# Patient Record
Sex: FEMALE | Race: WHITE | Employment: FULL TIME | URBAN - METROPOLITAN AREA
[De-identification: names, ages, dates, MRNs, and addresses within clinical notes are randomized per-mention and may not be internally consistent; named-entity substitution may affect disease eponyms.]

---

## 2018-10-15 ENCOUNTER — APPOINTMENT (OUTPATIENT)
Dept: LAB | Age: 53
End: 2018-10-15
Attending: INTERNAL MEDICINE
Payer: COMMERCIAL

## 2018-10-15 ENCOUNTER — OFFICE VISIT (OUTPATIENT)
Dept: INTERNAL MEDICINE CLINIC | Facility: CLINIC | Age: 53
End: 2018-10-15
Payer: COMMERCIAL

## 2018-10-15 ENCOUNTER — NURSE TRIAGE (OUTPATIENT)
Dept: OTHER | Age: 53
End: 2018-10-15

## 2018-10-15 VITALS
SYSTOLIC BLOOD PRESSURE: 120 MMHG | WEIGHT: 114 LBS | BODY MASS INDEX: 20.98 KG/M2 | DIASTOLIC BLOOD PRESSURE: 80 MMHG | TEMPERATURE: 97 F | HEIGHT: 62 IN | RESPIRATION RATE: 12 BRPM | HEART RATE: 81 BPM

## 2018-10-15 DIAGNOSIS — R31.9 HEMATURIA, UNSPECIFIED TYPE: Primary | ICD-10-CM

## 2018-10-15 DIAGNOSIS — R31.9 HEMATURIA, UNSPECIFIED TYPE: ICD-10-CM

## 2018-10-15 PROCEDURE — 81001 URINALYSIS AUTO W/SCOPE: CPT

## 2018-10-15 PROCEDURE — 99212 OFFICE O/P EST SF 10 MIN: CPT | Performed by: INTERNAL MEDICINE

## 2018-10-15 PROCEDURE — 99214 OFFICE O/P EST MOD 30 MIN: CPT | Performed by: INTERNAL MEDICINE

## 2018-10-15 PROCEDURE — 87086 URINE CULTURE/COLONY COUNT: CPT

## 2018-10-15 NOTE — PROGRESS NOTES
HPI:    Patient ID: Jesus Ortiz is a 46year old female. Hematuria   This is a new problem. The current episode started in the past 7 days (2 to 3 days). The problem has been waxing and waning since onset.  She describes the hematuria as gross hemat She exhibits no distension and no mass. There is no tenderness. There is no rebound, no guarding and no CVA tenderness. Musculoskeletal: She exhibits no edema. Lymphadenopathy:     She has no cervical adenopathy. Neurological: She is alert.    Skin: N

## 2018-10-15 NOTE — TELEPHONE ENCOUNTER
Action Requested: Summary for Provider     []  Critical Lab, Recommendations Needed  [] Need Additional Advice  []   FYI    []   Need Orders  [] Need Medications Sent to Pharmacy  []  Other     SUMMARY: DR Kincaid: any way you can add patient to schedul

## 2018-10-16 ENCOUNTER — TELEPHONE (OUTPATIENT)
Dept: OTHER | Age: 53
End: 2018-10-16

## 2018-10-16 DIAGNOSIS — R31.9 PAINLESS HEMATURIA: Primary | ICD-10-CM

## 2018-10-16 NOTE — TELEPHONE ENCOUNTER
Her urinalysis showed lots of red blood cells and no white cells. Her urine culture negative so she doesn't have uti but needs further eval for cause of hematuria.   She will need to get ct urogram (order in epic but may need PA so have managed care work ch

## 2018-10-17 NOTE — TELEPHONE ENCOUNTER
Spoke with patient (identified name and ), results reviewed and agrees with plan. She will call her insurance company to check on authorization first for she has a PPO insurance. Phone number given for central scheduling.    Patient stated she did no

## 2019-01-28 ENCOUNTER — TELEPHONE (OUTPATIENT)
Dept: INTERNAL MEDICINE CLINIC | Facility: CLINIC | Age: 54
End: 2019-01-28

## 2019-01-28 NOTE — TELEPHONE ENCOUNTER
pls call pt; 3mos ago she was seen for hematuria in the [facility] and she was given order for ct urogram but I dont see any results.  Did she have this done already at another hospital? If not, she needs to get this done or at least see urologist ( she can see

## 2019-01-31 NOTE — TELEPHONE ENCOUNTER
LMTCB, also sent certified no response letter regarding CT, including recommendations to see urologist, noted per Dr Breanna Santana.

## 2021-04-15 ENCOUNTER — TELEPHONE (OUTPATIENT)
Dept: INTERNAL MEDICINE CLINIC | Facility: CLINIC | Age: 56
End: 2021-04-15

## 2021-04-15 NOTE — TELEPHONE ENCOUNTER
Patient is calling and will like for Dr. Malena Rubalcava to look at her labs results for Dr. Marci Montoya and call her back.

## 2021-04-16 NOTE — TELEPHONE ENCOUNTER
I had reviewed her labs and her TSH was just mildly elevated with normal free T4 so possible subclinical hypothyroidism. Needs to be repeated though to verify. Should see me in clinic since I had not seen her for 3 yrs.

## 2021-04-16 NOTE — TELEPHONE ENCOUNTER
Advised patient of Dr Lenny Banda note. Patient verbalized understanding. She states she will call back and make an appointment.

## 2024-07-02 ENCOUNTER — MED REC SCAN ONLY (OUTPATIENT)
Dept: INTERNAL MEDICINE CLINIC | Facility: CLINIC | Age: 59
End: 2024-07-02